# Patient Record
Sex: MALE | Race: OTHER | URBAN - METROPOLITAN AREA
[De-identification: names, ages, dates, MRNs, and addresses within clinical notes are randomized per-mention and may not be internally consistent; named-entity substitution may affect disease eponyms.]

---
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
## 2023-05-15 RX ORDER — FLUTICASONE PROPIONATE 50 MCG
1 SPRAY, SUSPENSION (ML) NASAL DAILY
COMMUNITY

## 2023-05-15 RX ORDER — EPINEPHRINE 0.3 MG/.3ML
INJECTION SUBCUTANEOUS
COMMUNITY
Start: 2020-09-10

## 2023-05-15 RX ORDER — ALBUTEROL SULFATE 90 UG/1
AEROSOL, METERED RESPIRATORY (INHALATION)
COMMUNITY
Start: 2020-09-17

## 2023-05-15 RX ORDER — OMEPRAZOLE 40 MG/1
40 CAPSULE, DELAYED RELEASE ORAL DAILY
COMMUNITY
Start: 2021-01-21

## 2023-05-15 RX ORDER — CETIRIZINE HYDROCHLORIDE 10 MG/1
10 TABLET, CHEWABLE ORAL DAILY
COMMUNITY

## 2023-11-27 NOTE — PATIENT INSTRUCTIONS
Start Omeprazole 40 mg once daily 30 minutes before breakfast 
Avoid acidic, spicy or greasy foods and Ibuprofen Follow up in 4 weeks. If no improvement in 4 weeks, we can consider endoscopy Referral to pediatric Cardiology Foods to avoid 
citrus fruits-fruit juices, orange juice, anthony, limes, grapefruit 
chocolate or sour candy Gum - sour gum, or regular Drinks with caffeine - iced tea or soda Fatty and fried foods - wings, french fries, chips Garlic and onions Spicy foods  - hot cheetos, Takis Tomato-based foods, like spaghetti sauce, salsa, chili, and pizza Avoiding food 2 to 3 hours before bed may also help. Office contact number: [phone] Outpatient lab Location: 3rd floor, [address] Same day X ray: Please go to outpatient registration in ground floor for guidance Scheduling Image: Please call [phone] to schedule any imaging
Yes